# Patient Record
Sex: FEMALE
[De-identification: names, ages, dates, MRNs, and addresses within clinical notes are randomized per-mention and may not be internally consistent; named-entity substitution may affect disease eponyms.]

---

## 2018-05-22 PROBLEM — Z00.00 ENCOUNTER FOR PREVENTIVE HEALTH EXAMINATION: Status: ACTIVE | Noted: 2018-05-22

## 2018-06-04 ENCOUNTER — APPOINTMENT (OUTPATIENT)
Dept: CARDIOLOGY | Facility: CLINIC | Age: 58
End: 2018-06-04

## 2023-11-02 ENCOUNTER — APPOINTMENT (OUTPATIENT)
Dept: ORTHOPEDIC SURGERY | Facility: CLINIC | Age: 63
End: 2023-11-02

## 2024-09-22 ENCOUNTER — EMERGENCY (EMERGENCY)
Facility: HOSPITAL | Age: 64
LOS: 1 days | Discharge: HOME | End: 2024-09-22
Attending: STUDENT IN AN ORGANIZED HEALTH CARE EDUCATION/TRAINING PROGRAM
Payer: SELF-PAY

## 2024-09-22 VITALS
SYSTOLIC BLOOD PRESSURE: 179 MMHG | HEART RATE: 87 BPM | DIASTOLIC BLOOD PRESSURE: 83 MMHG | OXYGEN SATURATION: 98 % | WEIGHT: 229.06 LBS | HEIGHT: 62 IN | RESPIRATION RATE: 16 BRPM | TEMPERATURE: 98 F

## 2024-09-22 VITALS
RESPIRATION RATE: 18 BRPM | DIASTOLIC BLOOD PRESSURE: 82 MMHG | SYSTOLIC BLOOD PRESSURE: 154 MMHG | OXYGEN SATURATION: 99 % | TEMPERATURE: 98 F | HEART RATE: 76 BPM

## 2024-09-22 LAB
ALBUMIN SERPL ELPH-MCNC: 3.4 G/DL — LOW (ref 3.5–5)
ALP SERPL-CCNC: 97 U/L — SIGNIFICANT CHANGE UP (ref 40–120)
ALT FLD-CCNC: 28 U/L DA — SIGNIFICANT CHANGE UP (ref 10–60)
ANION GAP SERPL CALC-SCNC: 5 MMOL/L — SIGNIFICANT CHANGE UP (ref 5–17)
APTT BLD: 32.4 SEC — SIGNIFICANT CHANGE UP (ref 24.5–35.6)
AST SERPL-CCNC: 24 U/L — SIGNIFICANT CHANGE UP (ref 10–40)
BASOPHILS # BLD AUTO: 0.03 K/UL — SIGNIFICANT CHANGE UP (ref 0–0.2)
BASOPHILS NFR BLD AUTO: 0.4 % — SIGNIFICANT CHANGE UP (ref 0–2)
BILIRUB SERPL-MCNC: 0.2 MG/DL — SIGNIFICANT CHANGE UP (ref 0.2–1.2)
BUN SERPL-MCNC: 13 MG/DL — SIGNIFICANT CHANGE UP (ref 7–18)
CALCIUM SERPL-MCNC: 9.3 MG/DL — SIGNIFICANT CHANGE UP (ref 8.4–10.5)
CHLORIDE SERPL-SCNC: 107 MMOL/L — SIGNIFICANT CHANGE UP (ref 96–108)
CO2 SERPL-SCNC: 28 MMOL/L — SIGNIFICANT CHANGE UP (ref 22–31)
CREAT SERPL-MCNC: 0.74 MG/DL — SIGNIFICANT CHANGE UP (ref 0.5–1.3)
EGFR: 90 ML/MIN/1.73M2 — SIGNIFICANT CHANGE UP
EOSINOPHIL # BLD AUTO: 0.08 K/UL — SIGNIFICANT CHANGE UP (ref 0–0.5)
EOSINOPHIL NFR BLD AUTO: 1 % — SIGNIFICANT CHANGE UP (ref 0–6)
FLUAV AG NPH QL: SIGNIFICANT CHANGE UP
FLUBV AG NPH QL: SIGNIFICANT CHANGE UP
GLUCOSE SERPL-MCNC: 127 MG/DL — HIGH (ref 70–99)
HCT VFR BLD CALC: 37.3 % — SIGNIFICANT CHANGE UP (ref 34.5–45)
HGB BLD-MCNC: 12.2 G/DL — SIGNIFICANT CHANGE UP (ref 11.5–15.5)
IMM GRANULOCYTES NFR BLD AUTO: 0.1 % — SIGNIFICANT CHANGE UP (ref 0–0.9)
INR BLD: 0.99 RATIO — SIGNIFICANT CHANGE UP (ref 0.85–1.18)
LYMPHOCYTES # BLD AUTO: 1.96 K/UL — SIGNIFICANT CHANGE UP (ref 1–3.3)
LYMPHOCYTES # BLD AUTO: 25.1 % — SIGNIFICANT CHANGE UP (ref 13–44)
MCHC RBC-ENTMCNC: 30.3 PG — SIGNIFICANT CHANGE UP (ref 27–34)
MCHC RBC-ENTMCNC: 32.7 GM/DL — SIGNIFICANT CHANGE UP (ref 32–36)
MCV RBC AUTO: 92.8 FL — SIGNIFICANT CHANGE UP (ref 80–100)
MONOCYTES # BLD AUTO: 0.53 K/UL — SIGNIFICANT CHANGE UP (ref 0–0.9)
MONOCYTES NFR BLD AUTO: 6.8 % — SIGNIFICANT CHANGE UP (ref 2–14)
NEUTROPHILS # BLD AUTO: 5.21 K/UL — SIGNIFICANT CHANGE UP (ref 1.8–7.4)
NEUTROPHILS NFR BLD AUTO: 66.6 % — SIGNIFICANT CHANGE UP (ref 43–77)
NRBC # BLD: 0 /100 WBCS — SIGNIFICANT CHANGE UP (ref 0–0)
NT-PROBNP SERPL-SCNC: 81 PG/ML — SIGNIFICANT CHANGE UP (ref 0–125)
PLATELET # BLD AUTO: 282 K/UL — SIGNIFICANT CHANGE UP (ref 150–400)
POTASSIUM SERPL-MCNC: 4.4 MMOL/L — SIGNIFICANT CHANGE UP (ref 3.5–5.3)
POTASSIUM SERPL-SCNC: 4.4 MMOL/L — SIGNIFICANT CHANGE UP (ref 3.5–5.3)
PROT SERPL-MCNC: 7.7 G/DL — SIGNIFICANT CHANGE UP (ref 6–8.3)
PROTHROM AB SERPL-ACNC: 11.3 SEC — SIGNIFICANT CHANGE UP (ref 9.5–13)
RBC # BLD: 4.02 M/UL — SIGNIFICANT CHANGE UP (ref 3.8–5.2)
RBC # FLD: 13.3 % — SIGNIFICANT CHANGE UP (ref 10.3–14.5)
SARS-COV-2 RNA SPEC QL NAA+PROBE: SIGNIFICANT CHANGE UP
SODIUM SERPL-SCNC: 140 MMOL/L — SIGNIFICANT CHANGE UP (ref 135–145)
TROPONIN I, HIGH SENSITIVITY RESULT: 5.5 NG/L — SIGNIFICANT CHANGE UP
WBC # BLD: 7.82 K/UL — SIGNIFICANT CHANGE UP (ref 3.8–10.5)
WBC # FLD AUTO: 7.82 K/UL — SIGNIFICANT CHANGE UP (ref 3.8–10.5)

## 2024-09-22 PROCEDURE — 99285 EMERGENCY DEPT VISIT HI MDM: CPT | Mod: 25

## 2024-09-22 PROCEDURE — 71250 CT THORAX DX C-: CPT | Mod: 26,MC

## 2024-09-22 PROCEDURE — 93005 ELECTROCARDIOGRAM TRACING: CPT

## 2024-09-22 PROCEDURE — 36415 COLL VENOUS BLD VENIPUNCTURE: CPT

## 2024-09-22 PROCEDURE — 84484 ASSAY OF TROPONIN QUANT: CPT

## 2024-09-22 PROCEDURE — 85730 THROMBOPLASTIN TIME PARTIAL: CPT

## 2024-09-22 PROCEDURE — 85610 PROTHROMBIN TIME: CPT

## 2024-09-22 PROCEDURE — 80053 COMPREHEN METABOLIC PANEL: CPT

## 2024-09-22 PROCEDURE — 96360 HYDRATION IV INFUSION INIT: CPT

## 2024-09-22 PROCEDURE — 87636 SARSCOV2 & INF A&B AMP PRB: CPT

## 2024-09-22 PROCEDURE — 99285 EMERGENCY DEPT VISIT HI MDM: CPT

## 2024-09-22 PROCEDURE — 85025 COMPLETE CBC W/AUTO DIFF WBC: CPT

## 2024-09-22 PROCEDURE — 71250 CT THORAX DX C-: CPT | Mod: MC

## 2024-09-22 PROCEDURE — 83880 ASSAY OF NATRIURETIC PEPTIDE: CPT

## 2024-09-22 RX ORDER — AZITHROMYCIN 500 MG/1
500 TABLET, FILM COATED ORAL ONCE
Refills: 0 | Status: COMPLETED | OUTPATIENT
Start: 2024-09-22 | End: 2024-09-22

## 2024-09-22 RX ORDER — SODIUM CHLORIDE 9 MG/ML
1000 INJECTION INTRAMUSCULAR; INTRAVENOUS; SUBCUTANEOUS ONCE
Refills: 0 | Status: COMPLETED | OUTPATIENT
Start: 2024-09-22 | End: 2024-09-22

## 2024-09-22 RX ORDER — AZITHROMYCIN 500 MG/1
1 TABLET, FILM COATED ORAL
Qty: 4 | Refills: 0
Start: 2024-09-22 | End: 2024-09-25

## 2024-09-22 RX ORDER — GUAIFENESIN/DEXTROMETHORPHAN 100-10MG/5
5 SYRUP ORAL ONCE
Refills: 0 | Status: COMPLETED | OUTPATIENT
Start: 2024-09-22 | End: 2024-09-22

## 2024-09-22 RX ADMIN — AZITHROMYCIN 500 MILLIGRAM(S): 500 TABLET, FILM COATED ORAL at 19:11

## 2024-09-22 RX ADMIN — Medication 5 MILLILITER(S): at 17:51

## 2024-09-22 RX ADMIN — SODIUM CHLORIDE 1000 MILLILITER(S): 9 INJECTION INTRAMUSCULAR; INTRAVENOUS; SUBCUTANEOUS at 18:36

## 2024-09-22 RX ADMIN — SODIUM CHLORIDE 1000 MILLILITER(S): 9 INJECTION INTRAMUSCULAR; INTRAVENOUS; SUBCUTANEOUS at 17:36

## 2024-09-22 NOTE — ED PROVIDER NOTE - PHYSICAL EXAMINATION
Gen: no acute distress  Head: normocephalic, atraumatic  Lung: CTAB, no respiratory distress, no wheezing, rales, rhonchi  CV: normal s1/s2, rrr,   Abd: soft, non-tender, non-distended  MSK: No edema, no visible deformities, full range of motion in all 4 extremities  Neuro: No focal neurologic deficits

## 2024-09-22 NOTE — ED PROVIDER NOTE - OBJECTIVE STATEMENT
Patient is a 64-year-old female past medical history hypertension on metoprolol and amlodipine presenting with 3 months of dry cough, congestion, shortness of breath, orthopnea.   Patient states that she has had approximately 3 months of a dry cough, nasal congestion, shortness of breath when trying to sleep at night especially when laying flat.  Denies any other past medical history, allergies.  Denies any smoking, alcohol use, cardiac history, family history of cardiac disease.  Denies any chest pain.  Patient went to urgent care last week and was prescribed albuterol, Tessalon Perles  which have not helped.

## 2024-09-22 NOTE — ED PROVIDER NOTE - PROGRESS NOTE DETAILS
JK - Labs within normal limits.  Imaging without evidence of pneumonia. Labs EKG WNL.  Lung nodule noted, discussed with patient to follow-up in 6 months for repeat imaging.  Well-appearing, tolerating p.o., vital signs stable.  Will give azithromycin for anti-inflammatory properties for acute on chronic bronchitis, DC with antibiotic prescription and primary care follow-up.

## 2024-09-22 NOTE — ED PROVIDER NOTE - PATIENT PORTAL LINK FT
You can access the FollowMyHealth Patient Portal offered by [facility] by registering at the following website: [email or web address]. By joining [employer]’s FollowMyHealth portal, you will also be able to view your health information using other applications (apps) compatible with our system.

## 2024-09-22 NOTE — ED PROVIDER NOTE - CLINICAL SUMMARY MEDICAL DECISION MAKING FREE TEXT BOX
Patient is a 64-year-old female past medical history hypertension on metoprolol and amlodipine presenting with 3 months of dry cough, congestion, shortness of breath, orthopnea.  VSS lungs ctab well appearing.   -   Will check labs, rule out electrolyte abnormalities, EKG troponin to assess for ACS, BNP and CT chest to eval for heart failure versus pneumonia given recent CXR WNL, viral swab to evaluate for URI, reassess.

## 2024-09-22 NOTE — ED PROVIDER NOTE - NSFOLLOWUPINSTRUCTIONS_ED_ALL_ED_FT
Follow up with your Primary Care Doctor within 7 days. If you do not have one, you can call the Internal Medicine office number below and make an appointment.    [facility]  Internal Medicine  [address] [place] [address]  Phone: [phone]  Fax: [phone]      Bronquitis aguda en los adultos  Acute Bronchitis, Adult  A comparison between normal and swollen airways, or bronchi, in the lungs.  La bronquitis aguda es la inflamación repentina de las vías respiratorias principales (bronquios) que salen de la tráquea en los pulmones. La hinchazón hace que las vías respiratorias se reduzcan y produzcan más mucosidad de lo normal. [place] puede dificultar la respiración y provocar tos o respiración ruidosa (sibilancia).    La bronquitis aguda puede durar varias semanas. La tos puede durar más tiempo. Las alergias, el asma y la exposición al humo pueden empeorar la afección.    ¿Cuáles son las causas?  Esta afección puede ser causada por microbios y por sustancias que irritan los pulmones, por ejemplo:  Virus del resfrío y de la gripe. La causa más frecuente de esta afección es el virus que provoca el resfrío común.  Bacterias. [place] es menos frecuente.  Aspirar sustancias que irritan los pulmones, por ejemplo:  Humo de cigarrillos y otros productos de tabaco.  Polvo y polen.  Vapores de productos de limpieza domésticos, gases o combustible quemado.  Contaminación del aire interior o exterior.  ¿Qué incrementa el riesgo?  Los siguientes factores pueden hacer que sea más propenso a desarrollar esta afección:  Debilitamiento del sistema de defensa del organismo, también denominado sistema inmunitario.  Perla afección que afecte a los pulmones y la respiración, bailee el asma.  ¿Cuáles son los signos o síntomas?  Los síntomas frecuentes de esta afección incluyen los siguientes:  Tos. Puede acompañarse de mucosidad transparente, amarillenta o verdosa procedente de los pulmones (esputo).  Sibilancias.  Secreción o congestión nasal.  Exceso de mucosidad en los pulmones (congestión torácica).  Falta de aire.  Radha y molestias, incluido dolor de garganta o de pecho.  ¿Cómo se diagnostica?  Esta afección, usualmente, se diagnostica en función de lo siguiente:  Los síntomas y los antecedentes médicos.  Un examen físico.  También pueden realizarle otros estudios, incluidas pruebas para descartar otras afecciones, bailee la neumonía. Estos estudios incluyen:  Perla prueba de función pulmonar.  Análisis de perla muestra de mucosidad para detectar la presencia de bacterias.  Pruebas para controlar el nivel de oxígeno en la alisia.  Análisis de alisia.  Radiografía de tórax.  ¿Cómo se trata?  La mayoría de los casos de bronquitis aguda se recupera con el tiempo, sin tratamiento. El médico puede recomendarle lo siguiente:  Beber más líquidos para ayudar a diluir la mucosidad de modo que sea más fácil expectorarla.  Usar medicamentos inhalados (inhalador) para mejorar el flujo de aire que entra y sale de los pulmones.  Usar un vaporizador o un humificador. Se trata de aparatos que aportan humedad al ambiente para ayudar a respirar mejor.  Jared un medicamento que diluye la mucosidad y melyssa la congestión (expectorante).  Jared un medicamento que previene o detiene la tos (antitusivo).  No esfrecuente jared un antibiótico para esta afección.    Siga estas indicaciones en corral casa:  A do not smoke cigarettes sign.  Use los medicamentos de venta nazanin y los recetados solamente bailee se lo haya indicado el médico.  Utilice un inhalador, un vaporizador o un humidificador, bailee se lo haya indicado el médico.  [place] dos cucharaditas (10 ml) de miel a la hora de acostarse para disminuir la tos por la noche.  Alyse suficiente líquido bailee para mantener la orina de color amarillo pálido.  No consuma ningún producto que contenga nicotina o tabaco. Estos productos incluyen cigarrillos, tabaco para mascar y aparatos de vapeo, bailee los cigarrillos electrónicos. Si necesita ayuda para dejar de consumir estos productos, consulte al médico.  Descanse mucho.  Retome tanya actividades normales según lo indicado por el médico. Pregúntele al médico qué actividades son seguras para usted.  Concurra a todas las visitas de seguimiento. [place] es importante.  ¿Cómo se previene?  Washing hands with soap and water.  Para disminuir el riesgo de volver a sufrir esta afección:  Lávese las bill frecuentemente con agua y jabón sandeep al menos 20 segundos. Use desinfectante para bill si no dispone de agua y jabón.  Evite el contacto con personas que tienen síntomas de resfrío.  Trate de no llevarse las bill a la boca, la nariz o los ojos.  Evite inhalar humo o vapores químicos. La inhalación de humo o vapores químicos hará que corral afección empeore.  Aplíquese la vacuna antigripal todos los años.  Comuníquese con un médico si:  Los síntomas no mejoran después de 2 semanas.  Tiene dificultad para expulsar la mucosidad al toser.  La tos lo mantiene despierto por la noche.  Tiene fiebre.  Solicite ayuda de inmediato si:  Tose con alisia.  Siente dolor en el pecho.  Tiene falta de aire grave.  Se desmaya o se siente bailee si se fuera a desmayar.  Tiene un dolor de sonia intenso.  Tiene fiebre o escalofríos que empeoran.  Estos síntomas pueden representar un problema grave que constituye perla emergencia. No espere a yamil si los síntomas desaparecen. Solicite atención médica de inmediato. Comuníquese con el servicio de emergencias de corral localidad (911 en los [place]). No conduzca por tanya propios medios hasta el hospital.    Resumen  La bronquitis aguda es la inflamación de las vías respiratorias principales (bronquios) que salen de la tráquea en los pulmones. La hinchazón hace que las vías respiratorias se reduzcan y produzcan más mucosidad de lo normal.  Beber más líquidos puede ayudar a diluir la mucosidad de modo que sea más fácil expectorarla.  Use los medicamentos de venta nazanin y los recetados solamente bailee se lo haya indicado el médico.  No consuma ningún producto que contenga nicotina o tabaco. Estos productos incluyen cigarrillos, tabaco para mascar y aparatos de vapeo, bailee los cigarrillos electrónicos. Si necesita ayuda para dejar de consumir estos productos, consulte al médico.  Comuníquese con un médico si los síntomas no mejoran después de 2 semanas.  Esta información no tiene bailee fin reemplazar el consejo del médico. Asegúrese de hacerle al médico cualquier pregunta que tenga.    Document Revised: 05/09/2022 Document Reviewed: 05/09/2022  Elsevier Patient Education © 2024 [employer]

## 2024-10-03 ENCOUNTER — NON-APPOINTMENT (OUTPATIENT)
Age: 64
End: 2024-10-03

## 2024-10-04 ENCOUNTER — APPOINTMENT (OUTPATIENT)
Dept: INTERNAL MEDICINE | Facility: CLINIC | Age: 64
End: 2024-10-04
Payer: COMMERCIAL

## 2024-10-04 ENCOUNTER — OUTPATIENT (OUTPATIENT)
Dept: OUTPATIENT SERVICES | Facility: HOSPITAL | Age: 64
LOS: 1 days | End: 2024-10-04
Payer: COMMERCIAL

## 2024-10-04 VITALS
HEART RATE: 84 BPM | RESPIRATION RATE: 18 BRPM | WEIGHT: 244.38 LBS | BODY MASS INDEX: 44.97 KG/M2 | SYSTOLIC BLOOD PRESSURE: 137 MMHG | OXYGEN SATURATION: 97 % | DIASTOLIC BLOOD PRESSURE: 85 MMHG | HEIGHT: 62 IN | TEMPERATURE: 97.7 F

## 2024-10-04 DIAGNOSIS — Z78.9 OTHER SPECIFIED HEALTH STATUS: ICD-10-CM

## 2024-10-04 DIAGNOSIS — J06.9 ACUTE UPPER RESPIRATORY INFECTION, UNSPECIFIED: ICD-10-CM

## 2024-10-04 DIAGNOSIS — E66.01 MORBID (SEVERE) OBESITY DUE TO EXCESS CALORIES: ICD-10-CM

## 2024-10-04 DIAGNOSIS — I10 ESSENTIAL (PRIMARY) HYPERTENSION: ICD-10-CM

## 2024-10-04 DIAGNOSIS — M17.0 BILATERAL PRIMARY OSTEOARTHRITIS OF KNEE: ICD-10-CM

## 2024-10-04 DIAGNOSIS — Z00.00 ENCOUNTER FOR GENERAL ADULT MEDICAL EXAMINATION WITHOUT ABNORMAL FINDINGS: ICD-10-CM

## 2024-10-04 DIAGNOSIS — Z82.3 FAMILY HISTORY OF STROKE: ICD-10-CM

## 2024-10-04 DIAGNOSIS — I83.90 ASYMPTOMATIC VARICOSE VEINS OF UNSPECIFIED LOWER EXTREMITY: ICD-10-CM

## 2024-10-04 PROCEDURE — G0463: CPT

## 2024-10-04 PROCEDURE — 99204 OFFICE O/P NEW MOD 45 MIN: CPT

## 2024-10-04 PROCEDURE — G2211 COMPLEX E/M VISIT ADD ON: CPT | Mod: NC

## 2024-10-04 RX ORDER — ALBUTEROL SULFATE 90 UG/1
108 (90 BASE) INHALANT RESPIRATORY (INHALATION)
Qty: 1 | Refills: 0 | Status: ACTIVE | COMMUNITY
Start: 2024-10-04 | End: 1900-01-01

## 2024-10-04 RX ORDER — METOPROLOL TARTRATE 50 MG/1
50 TABLET ORAL
Refills: 0 | Status: ACTIVE | COMMUNITY

## 2024-10-04 RX ORDER — AMLODIPINE BESYLATE 2.5 MG/1
2.5 TABLET ORAL
Refills: 0 | Status: ACTIVE | COMMUNITY

## 2024-10-04 RX ORDER — MELOXICAM 7.5 MG/1
7.5 TABLET ORAL
Qty: 14 | Refills: 0 | Status: ACTIVE | COMMUNITY
Start: 2024-10-04 | End: 1900-01-01

## 2024-10-04 RX ORDER — AMOXICILLIN 500 MG/1
500 TABLET, FILM COATED ORAL EVERY 8 HOURS
Qty: 21 | Refills: 0 | Status: ACTIVE | COMMUNITY
Start: 2024-10-04 | End: 1900-01-01

## 2024-10-04 RX ORDER — BENZONATATE 100 MG/1
100 CAPSULE ORAL
Qty: 30 | Refills: 0 | Status: ACTIVE | COMMUNITY
Start: 2024-10-04 | End: 1900-01-01

## 2024-10-04 NOTE — ASSESSMENT
[FreeTextEntry1] : Patient is a 63 y/o F w/ HTN who presented for f/u from the ED for acute bronchitis/URI  1) Acute Bronchitis/URI - likely new incident from July 2024 - took 2 doses of Azithromyzin w/ no relief - PE unremarkable - reviewed labs and CT Chest from the ED (9/22) which were all unremarkable - prescribed fluticasone but she took it through her mouth - explained that possible causes are viral or allergies - educated on correct way of using fluticasone - prescribed amoxicillin 500 mg q8 x 7 days - prescribed albuterol inhaler prn for wheezing and shortness of breath - advised to come back in 1 month for CPE - consider referral to Pulmo for PFTs if symptoms do not improve  2) HTN - BP well-controlled with metoprolol and amlodipine - continue BP monitoring  3) Morbid Obesity - BMI - 44.7 - advised diet, exercise, weight loss - will check labs next visit when she is not acutely ill  4) OA of the knees - likely from underlying morbid obesity - advised weight loss  5) Varicose Veins - will refer to Vascular Surgery next visit  Total time spent caring for the patient today was 40 minutes. This includes time spent before the visit reviewing the chart, time spent during visit, and time spent after the visit on documentation, etc.

## 2024-10-04 NOTE — PHYSICAL EXAM
[No Acute Distress] : no acute distress [Well-Appearing] : well-appearing [Normal Sclera/Conjunctiva] : normal sclera/conjunctiva [PERRL] : pupils equal round and reactive to light [EOMI] : extraocular movements intact [Normal Outer Ear/Nose] : the outer ears and nose were normal in appearance [Normal Oropharynx] : the oropharynx was normal [No JVD] : no jugular venous distention [No Lymphadenopathy] : no lymphadenopathy [Supple] : supple [Thyroid Normal, No Nodules] : the thyroid was normal and there were no nodules present [No Respiratory Distress] : no respiratory distress  [No Accessory Muscle Use] : no accessory muscle use [Clear to Auscultation] : lungs were clear to auscultation bilaterally [Normal Rate] : normal rate  [Regular Rhythm] : with a regular rhythm [Normal S1, S2] : normal S1 and S2 [No Murmur] : no murmur heard [No Carotid Bruits] : no carotid bruits [No Abdominal Bruit] : a ~M bruit was not heard ~T in the abdomen [Pedal Pulses Present] : the pedal pulses are present [No Edema] : there was no peripheral edema [No Palpable Aorta] : no palpable aorta [No Extremity Clubbing/Cyanosis] : no extremity clubbing/cyanosis [Soft] : abdomen soft [Non Tender] : non-tender [Non-distended] : non-distended [No Masses] : no abdominal mass palpated [No HSM] : no HSM [Normal Bowel Sounds] : normal bowel sounds [Normal Posterior Cervical Nodes] : no posterior cervical lymphadenopathy [Normal Anterior Cervical Nodes] : no anterior cervical lymphadenopathy [No CVA Tenderness] : no CVA  tenderness [No Spinal Tenderness] : no spinal tenderness [No Joint Swelling] : no joint swelling [Grossly Normal Strength/Tone] : grossly normal strength/tone [No Rash] : no rash [Coordination Grossly Intact] : coordination grossly intact [No Focal Deficits] : no focal deficits [Normal Gait] : normal gait [Deep Tendon Reflexes (DTR)] : deep tendon reflexes were 2+ and symmetric [Normal Affect] : the affect was normal [Normal Insight/Judgement] : insight and judgment were intact [de-identified] : morbidly obese [de-identified] : (+) varicose veins [de-identified] : (+) knee crepitations

## 2024-10-04 NOTE — REVIEW OF SYSTEMS
[Nasal Discharge] : nasal discharge [Postnasal Drip] : postnasal drip [Shortness Of Breath] : shortness of breath [Wheezing] : wheezing [Cough] : cough [Negative] : Heme/Lymph

## 2024-10-04 NOTE — HISTORY OF PRESENT ILLNESS
[FreeTextEntry1] : cough [de-identified] : Patient is a 63 y/o F w/ PMHx of HTN on metoprolol and amlodipine presenting with 3 months of dry cough, congestion, shortness of breath. She was diagnosed with flu around July and self-medicated with Robitussin. Her symptoms disappeared. She went to the [place] 8/26 and stayed there for a week. When she came back, she started coughing and took Robitussin. She developed the same symptoms as previously. plus wheezing. She went to the urgent care and was prescribed Azithromycin and Fluticasone nasal spray, which afforded no relief. Her symptoms got worse which prompted her to go to the ED. CT Chest showed 2 mm RLL lung nodule and no pneumonia. She denies any headache, dizziness, nausea, vomiting, fever, chest pain, palpitation, heartburn, abdominal pain, diarrhea, constipation, dysuria, hematuria, back pain, joint pain, weight loss, loss of appetite

## 2024-10-10 ENCOUNTER — NON-APPOINTMENT (OUTPATIENT)
Age: 64
End: 2024-10-10

## 2024-10-10 DIAGNOSIS — E66.01 MORBID (SEVERE) OBESITY DUE TO EXCESS CALORIES: ICD-10-CM

## 2024-10-10 DIAGNOSIS — M17.0 BILATERAL PRIMARY OSTEOARTHRITIS OF KNEE: ICD-10-CM

## 2024-10-10 DIAGNOSIS — J06.9 ACUTE UPPER RESPIRATORY INFECTION, UNSPECIFIED: ICD-10-CM

## 2024-10-10 DIAGNOSIS — I83.90 ASYMPTOMATIC VARICOSE VEINS OF UNSPECIFIED LOWER EXTREMITY: ICD-10-CM

## 2024-10-10 DIAGNOSIS — I10 ESSENTIAL (PRIMARY) HYPERTENSION: ICD-10-CM

## 2024-11-05 ENCOUNTER — APPOINTMENT (OUTPATIENT)
Dept: INTERNAL MEDICINE | Facility: CLINIC | Age: 64
End: 2024-11-05